# Patient Record
Sex: FEMALE | URBAN - METROPOLITAN AREA
[De-identification: names, ages, dates, MRNs, and addresses within clinical notes are randomized per-mention and may not be internally consistent; named-entity substitution may affect disease eponyms.]

---

## 2022-07-12 ENCOUNTER — OFFICE VISIT (OUTPATIENT)
Dept: URBAN - METROPOLITAN AREA CLINIC 42 | Facility: CLINIC | Age: 62
End: 2022-07-12
Payer: COMMERCIAL

## 2022-07-12 DIAGNOSIS — H25.11 AGE-RELATED NUCLEAR CATARACT, RIGHT EYE: ICD-10-CM

## 2022-07-12 DIAGNOSIS — H25.042 POSTERIOR SUBCAPSULAR POLAR AGE RELATED CATARACT, LEFT EYE: Primary | ICD-10-CM

## 2022-07-12 PROCEDURE — 99204 OFFICE O/P NEW MOD 45 MIN: CPT | Performed by: OPHTHALMOLOGY

## 2022-07-12 PROCEDURE — 92136 OPHTHALMIC BIOMETRY: CPT | Performed by: OPHTHALMOLOGY

## 2022-07-12 ASSESSMENT — PACHYMETRY
OD: 3.39
OS: 3.84
OS: 23.51
OD: 23.29

## 2022-07-12 ASSESSMENT — INTRAOCULAR PRESSURE
OD: 14
OS: 14

## 2022-07-12 ASSESSMENT — VISUAL ACUITY
OD: 20/25
OS: CF

## 2022-07-12 NOTE — IMPRESSION/PLAN
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 Plan: Patient desires laser cataract surgery with standard IOL set for distance. Patient understands they will need reading glasses and potentially bifocals. The risks, benefits and alternatives were discussed at length and the patient wishes to proceed with cataract surgery. Recommend surgery OS only for now. Candidate for LenSx, ORA, Aim OS: plano. Patient will need glasses for full time wear. Pt will need gtts upon decision, next appt with counselor.

## 2022-07-12 NOTE — IMPRESSION/PLAN
Impression: Age-related nuclear cataract, right eye: H25.11. Plan: Mild OD. No treatment currently recommended. The patient will monitor vision changes and contact us with any decrease in vision.

## 2022-08-01 ENCOUNTER — Encounter (OUTPATIENT)
Dept: URBAN - METROPOLITAN AREA EXTERNAL CLINIC 14 | Facility: EXTERNAL CLINIC | Age: 62
End: 2022-08-01
Payer: COMMERCIAL

## 2022-08-01 PROCEDURE — 66984 XCAPSL CTRC RMVL W/O ECP: CPT | Performed by: OPHTHALMOLOGY

## 2022-08-02 ENCOUNTER — POST-OPERATIVE VISIT (OUTPATIENT)
Dept: URBAN - METROPOLITAN AREA CLINIC 42 | Facility: CLINIC | Age: 62
End: 2022-08-02
Payer: COMMERCIAL

## 2022-08-02 DIAGNOSIS — Z48.810 ENCOUNTER FOR SURGICAL AFTERCARE FOLLOWING SURGERY ON A SENSE ORGAN: Primary | ICD-10-CM

## 2022-08-02 PROCEDURE — 99024 POSTOP FOLLOW-UP VISIT: CPT | Performed by: OPHTHALMOLOGY

## 2022-08-02 ASSESSMENT — INTRAOCULAR PRESSURE: OS: 14

## 2022-08-02 NOTE — IMPRESSION/PLAN
Impression: S/P [facility] - PC IOL OS - 1 Day. Encounter for surgical aftercare following surgery on a sense organ  Z48.810.  Excellent post op course   Post operative instructions reviewed - Condition is improving - Plan: --Continue Pred-Moxi-Nepaf QID OS

## 2022-08-09 ENCOUNTER — POST-OPERATIVE VISIT (OUTPATIENT)
Dept: URBAN - METROPOLITAN AREA CLINIC 42 | Facility: CLINIC | Age: 62
End: 2022-08-09
Payer: COMMERCIAL

## 2022-08-09 DIAGNOSIS — Z48.810 ENCOUNTER FOR SURGICAL AFTERCARE FOLLOWING SURGERY ON A SENSE ORGAN: Primary | ICD-10-CM

## 2022-08-09 PROCEDURE — 99024 POSTOP FOLLOW-UP VISIT: CPT | Performed by: OPHTHALMOLOGY

## 2022-08-09 ASSESSMENT — VISUAL ACUITY
OS: 20/20
OD: 20/20

## 2022-08-09 ASSESSMENT — INTRAOCULAR PRESSURE: OS: 12

## 2022-08-09 NOTE — IMPRESSION/PLAN
Impression: S/P [facility] - PC IOL OS - 8 Days. Encounter for surgical aftercare following surgery on a sense organ  Z48.810. Excellent post op course   Post operative instructions reviewed - Condition is improving - Plan: --Advised patient to use artificial tears for comfort. --Continue Pred-Moxi-Nepaf